# Patient Record
Sex: MALE | Race: WHITE | NOT HISPANIC OR LATINO | URBAN - METROPOLITAN AREA
[De-identification: names, ages, dates, MRNs, and addresses within clinical notes are randomized per-mention and may not be internally consistent; named-entity substitution may affect disease eponyms.]

---

## 2017-01-18 ENCOUNTER — IMPORTED ENCOUNTER (OUTPATIENT)
Dept: URBAN - METROPOLITAN AREA CLINIC 9 | Facility: CLINIC | Age: 62
End: 2017-01-18

## 2019-11-26 NOTE — PATIENT DISCUSSION
Patient understands the vision may still be somewhat limited after cataract surgery due to the AMD and ERM.

## 2019-11-26 NOTE — PATIENT DISCUSSION
The patient was informed that with [address] [facility] for distance, they will need glasses for all near and intermediate activities after surgery. The patient understands there is a possibility they may need an enhancement after surgery. The patient elects [employer] OD, goal of emmetropia.

## 2019-12-17 NOTE — PATIENT DISCUSSION
The patient was informed that with [address] [facility] for distance, they will need glasses for all near and intermediate activities after surgery. The patient understands there is a possibility they may need an enhancement after surgery. The patient elects [employer] OD, goal of emmetropia.

## 2020-01-06 NOTE — PATIENT DISCUSSION
The patient was informed that with [address] [facility] for distance, they will need glasses for all near and intermediate activities after surgery. The patient understands there is a possibility they may need an enhancement after surgery. The patient elects [employer] OD, goal of emmetropia.

## 2020-01-06 NOTE — PATIENT DISCUSSION
The patient was informed that with [address] [facility] for distance, they will need glasses for all near and intermediate activities after surgery. The patient understands there is a possibility they may need an enhancement after surgery. The patient elects [employer] OD, goal of emmetropia.

## 2020-01-13 NOTE — PATIENT DISCUSSION
Cataract surgery has been performed in the first eye and activities of daily living are still impaired. The patient would like to proceed with cataract surgery in the second eye as scheduled. The patient elects Custom IOL OS, goal of emmetropia.

## 2020-02-27 NOTE — PATIENT DISCUSSION
This visual field clearly demonstrated a minimum of 51% loss of upper field of vision OD, with upper lid skin in repose and elevated by taping of the lid to demonstrate potential correction. This field shows that taping the lids significantly improved this patient's superior field of vision by approximately 49%, OD.

## 2020-10-29 ENCOUNTER — IMPORTED ENCOUNTER (OUTPATIENT)
Dept: URBAN - METROPOLITAN AREA CLINIC 9 | Facility: CLINIC | Age: 65
End: 2020-10-29

## 2021-10-16 ASSESSMENT — KERATOMETRY
OS_AXISANGLE_DEGREES: 41
OS_K1POWER_DIOPTERS: 44.5
OD_K1POWER_DIOPTERS: 44.25
OD_K1POWER_DIOPTERS: 44
OD_AXISANGLE2_DEGREES: 49
OS_K2POWER_DIOPTERS: 45.5
OS_K2POWER_DIOPTERS: 45
OD_AXISANGLE2_DEGREES: 31
OS_AXISANGLE2_DEGREES: 136
OD_AXISANGLE_DEGREES: 121
OS_AXISANGLE_DEGREES: 46
OD_K2POWER_DIOPTERS: 44.75
OS_AXISANGLE2_DEGREES: 131
OD_AXISANGLE_DEGREES: 139
OS_K1POWER_DIOPTERS: 44.5
OD_K2POWER_DIOPTERS: 44.75

## 2021-10-16 ASSESSMENT — VISUAL ACUITY
OD_CC: 20/25 SN
OD_CC: 20/20 SN
OS_SC: CF 2FT SN
OS_CC: 20/30 - SN
OS_CC: 20/25 SN
OD_CC: 20/30 - SN
OS_CC: 20/20 SN
OD_SC: CF 2FT SN
OD_CC: 20/20 - SN
OS_CC: 20/25 - SN

## 2021-10-16 ASSESSMENT — TONOMETRY
OD_IOP_MMHG: 16
OS_IOP_MMHG: 16
OS_IOP_MMHG: 15
OD_IOP_MMHG: 17

## 2022-03-16 ENCOUNTER — ESTABLISHED PATIENT (OUTPATIENT)
Dept: URBAN - METROPOLITAN AREA CLINIC 4 | Facility: CLINIC | Age: 67
End: 2022-03-16

## 2022-03-16 DIAGNOSIS — H02.831: ICD-10-CM

## 2022-03-16 DIAGNOSIS — H10.45: ICD-10-CM

## 2022-03-16 DIAGNOSIS — E11.9: ICD-10-CM

## 2022-03-16 DIAGNOSIS — H11.32: ICD-10-CM

## 2022-03-16 DIAGNOSIS — H25.13: ICD-10-CM

## 2022-03-16 DIAGNOSIS — H02.834: ICD-10-CM

## 2022-03-16 PROCEDURE — 92015 DETERMINE REFRACTIVE STATE: CPT

## 2022-03-16 PROCEDURE — 92014 COMPRE OPH EXAM EST PT 1/>: CPT

## 2022-03-16 ASSESSMENT — VISUAL ACUITY
OU_CC: 20/20
OD_GLARE: 20/60-2
OD_CC: 20/20-2
OS_GLARE: 20/50+1
OS_CC: 20/20-1

## 2022-03-16 ASSESSMENT — TONOMETRY
OD_IOP_MMHG: 17
OS_IOP_MMHG: 17

## 2022-03-16 ASSESSMENT — KERATOMETRY
OS_AXISANGLE2_DEGREES: 109
OD_K1POWER_DIOPTERS: 43.75
OD_K2POWER_DIOPTERS: 44.75
OD_AXISANGLE_DEGREES: 106
OS_K1POWER_DIOPTERS: 44.75
OD_AXISANGLE2_DEGREES: 16
OS_AXISANGLE_DEGREES: 19
OS_K2POWER_DIOPTERS: 45.50

## 2022-04-05 NOTE — PATIENT DISCUSSION
Recommend Bilateral lower lid blepharoplasty trans conj laser (discussed risks and benefits of sx. .. ). 05-Apr-2022 17:30

## 2023-06-19 ENCOUNTER — ESTABLISHED PATIENT (OUTPATIENT)
Dept: URBAN - METROPOLITAN AREA CLINIC 4 | Facility: CLINIC | Age: 68
End: 2023-06-19

## 2023-06-19 DIAGNOSIS — H02.831: ICD-10-CM

## 2023-06-19 DIAGNOSIS — H02.834: ICD-10-CM

## 2023-06-19 DIAGNOSIS — E11.9: ICD-10-CM

## 2023-06-19 DIAGNOSIS — H25.13: ICD-10-CM

## 2023-06-19 DIAGNOSIS — H10.45: ICD-10-CM

## 2023-06-19 PROCEDURE — 92015 DETERMINE REFRACTIVE STATE: CPT

## 2023-06-19 PROCEDURE — 92014 COMPRE OPH EXAM EST PT 1/>: CPT

## 2023-06-19 ASSESSMENT — KERATOMETRY
OD_AXISANGLE2_DEGREES: 27
OS_AXISANGLE2_DEGREES: 121
OS_K2POWER_DIOPTERS: 45.25
OD_AXISANGLE_DEGREES: 117
OS_AXISANGLE_DEGREES: 31
OD_K1POWER_DIOPTERS: 43.50
OS_K1POWER_DIOPTERS: 44.50
OD_K2POWER_DIOPTERS: 44.25

## 2023-06-19 ASSESSMENT — VISUAL ACUITY
OS_CC: 20/25
OD_CC: 20/25
OD_GLARE: 20/400
OS_GLARE: 20/400
OU_CC: 20/25

## 2023-06-19 ASSESSMENT — TONOMETRY
OS_IOP_MMHG: 16
OD_IOP_MMHG: 16

## 2024-07-02 ENCOUNTER — ESTABLISHED PATIENT (OUTPATIENT)
Facility: LOCATION | Age: 69
End: 2024-07-02

## 2024-07-02 DIAGNOSIS — H02.831: ICD-10-CM

## 2024-07-02 DIAGNOSIS — E11.9: ICD-10-CM

## 2024-07-02 DIAGNOSIS — H25.13: ICD-10-CM

## 2024-07-02 DIAGNOSIS — H10.45: ICD-10-CM

## 2024-07-02 DIAGNOSIS — H02.834: ICD-10-CM

## 2024-07-02 PROCEDURE — 92015 DETERMINE REFRACTIVE STATE: CPT

## 2024-07-02 PROCEDURE — 92014 COMPRE OPH EXAM EST PT 1/>: CPT

## 2024-07-02 ASSESSMENT — TONOMETRY
OS_IOP_MMHG: 12
OD_IOP_MMHG: 13

## 2024-07-02 ASSESSMENT — KERATOMETRY
OS_K1POWER_DIOPTERS: 44.50
OS_AXISANGLE2_DEGREES: 125
OD_AXISANGLE2_DEGREES: 18
OD_K1POWER_DIOPTERS: 44.00
OS_K2POWER_DIOPTERS: 45.00
OD_AXISANGLE_DEGREES: 108
OS_AXISANGLE_DEGREES: 35
OD_K2POWER_DIOPTERS: 44.50

## 2024-07-02 ASSESSMENT — VISUAL ACUITY
OS_CC: 20/30+1
OU_CC: 20/30-1
OD_GLARE: 20/60
OS_GLARE: 20/60
OD_CC: 20/40

## 2024-08-26 ENCOUNTER — PRE-OP/H&P (OUTPATIENT)
Facility: LOCATION | Age: 69
End: 2024-08-26

## 2024-08-26 DIAGNOSIS — H25.13: ICD-10-CM

## 2024-08-26 PROCEDURE — 92136 OPHTHALMIC BIOMETRY: CPT

## 2024-08-26 PROCEDURE — 99211PRE PRE OP VISIT

## 2024-10-01 ENCOUNTER — SURGERY/PROCEDURE (OUTPATIENT)
Dept: URBAN - METROPOLITAN AREA SURGERY 6 | Facility: SURGERY | Age: 69
End: 2024-10-01

## 2024-10-01 DIAGNOSIS — H25.13: ICD-10-CM

## 2024-10-01 PROBLEM — Z96.1: Noted: 2024-10-01

## 2024-10-01 PROCEDURE — 66984 XCAPSL CTRC RMVL W/O ECP: CPT

## 2024-10-02 ENCOUNTER — POST-OP (OUTPATIENT)
Facility: LOCATION | Age: 69
End: 2024-10-02

## 2024-10-02 DIAGNOSIS — Z96.1: ICD-10-CM

## 2024-10-02 PROCEDURE — 99024 POSTOP FOLLOW-UP VISIT: CPT

## 2024-10-21 ENCOUNTER — POST OP/EVAL OF SECOND EYE (OUTPATIENT)
Facility: LOCATION | Age: 69
End: 2024-10-21

## 2024-10-21 DIAGNOSIS — H25.12: ICD-10-CM

## 2024-10-21 DIAGNOSIS — Z96.1: ICD-10-CM

## 2024-10-29 PROBLEM — Z96.1: Noted: 2024-10-29

## 2024-10-30 ENCOUNTER — POST-OP (OUTPATIENT)
Facility: LOCATION | Age: 69
End: 2024-10-30

## 2024-10-30 DIAGNOSIS — Z96.1: ICD-10-CM

## 2024-10-30 PROCEDURE — 99024 POSTOP FOLLOW-UP VISIT: CPT

## 2024-11-18 ENCOUNTER — POST-OP (OUTPATIENT)
Facility: LOCATION | Age: 69
End: 2024-11-18

## 2024-11-18 DIAGNOSIS — Z96.1: ICD-10-CM

## 2024-11-18 PROCEDURE — 99024 POSTOP FOLLOW-UP VISIT: CPT

## 2024-12-16 ENCOUNTER — POST-OP (OUTPATIENT)
Age: 69
End: 2024-12-16

## 2024-12-16 DIAGNOSIS — Z96.1: ICD-10-CM

## 2024-12-16 PROCEDURE — 99024 POSTOP FOLLOW-UP VISIT: CPT
